# Patient Record
Sex: MALE | Race: WHITE | NOT HISPANIC OR LATINO | URBAN - METROPOLITAN AREA
[De-identification: names, ages, dates, MRNs, and addresses within clinical notes are randomized per-mention and may not be internally consistent; named-entity substitution may affect disease eponyms.]

---

## 2017-11-17 ENCOUNTER — OUTPATIENT (OUTPATIENT)
Dept: OUTPATIENT SERVICES | Facility: HOSPITAL | Age: 82
LOS: 1 days | Discharge: HOME | End: 2017-11-17
Payer: MEDICARE

## 2017-11-17 LAB
BUN SERPL-MCNC: 16 MG/DL — SIGNIFICANT CHANGE UP (ref 7–23)
CALCIUM SERPL-MCNC: 8.9 MG/DL — SIGNIFICANT CHANGE UP (ref 8.4–10.5)
CHLORIDE SERPL-SCNC: 104 MMOL/L — SIGNIFICANT CHANGE UP (ref 98–107)
CO2 SERPL-SCNC: 29 MMOL/L — SIGNIFICANT CHANGE UP (ref 22–31)
CREAT SERPL-MCNC: 0.84 MG/DL — SIGNIFICANT CHANGE UP (ref 0.5–1.3)
GLUCOSE SERPL-MCNC: 95 MG/DL — SIGNIFICANT CHANGE UP (ref 70–99)
HBA1C BLD-MCNC: 5.5 % — SIGNIFICANT CHANGE UP (ref 4–5.6)
HCT VFR BLD CALC: 48.6 % — SIGNIFICANT CHANGE UP (ref 39–50)
HGB BLD-MCNC: 16 G/DL — SIGNIFICANT CHANGE UP (ref 13–17)
MCHC RBC-ENTMCNC: 30.2 PG — SIGNIFICANT CHANGE UP (ref 27–34)
MCHC RBC-ENTMCNC: 32.9 % — SIGNIFICANT CHANGE UP (ref 32–36)
MCV RBC AUTO: 91.9 FL — SIGNIFICANT CHANGE UP (ref 80–100)
NRBC # FLD: 0 — SIGNIFICANT CHANGE UP
PLATELET # BLD AUTO: 193 K/UL — SIGNIFICANT CHANGE UP (ref 150–400)
PMV BLD: 9.9 FL — SIGNIFICANT CHANGE UP (ref 7–13)
POTASSIUM SERPL-MCNC: 4.2 MMOL/L — SIGNIFICANT CHANGE UP (ref 3.5–5.3)
POTASSIUM SERPL-SCNC: 4.2 MMOL/L — SIGNIFICANT CHANGE UP (ref 3.5–5.3)
RBC # BLD: 5.29 M/UL — SIGNIFICANT CHANGE UP (ref 4.2–5.8)
RBC # FLD: 13.8 % — SIGNIFICANT CHANGE UP (ref 10.3–14.5)
SODIUM SERPL-SCNC: 143 MMOL/L — SIGNIFICANT CHANGE UP (ref 135–145)
WBC # BLD: 7.86 K/UL — SIGNIFICANT CHANGE UP (ref 3.8–10.5)
WBC # FLD AUTO: 7.86 K/UL — SIGNIFICANT CHANGE UP (ref 3.8–10.5)

## 2017-11-17 PROCEDURE — 93010 ELECTROCARDIOGRAM REPORT: CPT

## 2017-11-17 RX ORDER — ASPIRIN/CALCIUM CARB/MAGNESIUM 324 MG
1 TABLET ORAL
Qty: 0 | Refills: 0 | COMMUNITY

## 2017-11-17 RX ORDER — SODIUM CHLORIDE 9 MG/ML
500 INJECTION INTRAMUSCULAR; INTRAVENOUS; SUBCUTANEOUS
Qty: 0 | Refills: 0 | Status: DISCONTINUED | OUTPATIENT
Start: 2017-11-17 | End: 2017-12-02

## 2017-11-17 RX ORDER — SODIUM CHLORIDE 9 MG/ML
3 INJECTION INTRAMUSCULAR; INTRAVENOUS; SUBCUTANEOUS EVERY 8 HOURS
Qty: 0 | Refills: 0 | Status: DISCONTINUED | OUTPATIENT
Start: 2017-11-17 | End: 2017-12-02

## 2017-11-17 RX ADMIN — SODIUM CHLORIDE 75 MILLILITER(S): 9 INJECTION INTRAMUSCULAR; INTRAVENOUS; SUBCUTANEOUS at 15:02

## 2017-11-17 NOTE — H&P CARDIOLOGY - NEGATIVE NEUROLOGICAL SYMPTOMS
no loss of consciousness/no hemiparesis/no facial palsy/no difficulty walking/no generalized seizures/no focal seizures/no headache/no confusion/no tremors/no vertigo/no loss of sensation/no paresthesias/no syncope/no transient paralysis/no weakness

## 2017-11-17 NOTE — H&P CARDIOLOGY - HISTORY OF PRESENT ILLNESS
82 y/o M w/ no sig. PMH presents for cardiac catheretization. Pt states that he has been experiencing dyspnea on exertion for the past few months which is worse when climbing stairs. Pt's echocardiogram from 10/10/17 showed EF 50-55%, global hypokinesis and mild to mod. AR. Pt denies N/V/D, fevers, chills, cough, palpitations, chest pain, substernal distress, syncope, orthopnea, nocturnal paroxysmal dyspnea, edema, cyanosis, hypertension, heart murmurs, varicosities, phlebitis, claudication. 84 y/o M w/ PMH of DVT s/p vein stripping presents for cardiac catheretization. Pt states that he has been experiencing dyspnea on exertion for the past few months which is worse when climbing stairs. Pt's echocardiogram from 10/10/17 showed EF 50-55%, global hypokinesis and mild to mod. AR. Pt denies N/V/D, fevers, chills, cough, palpitations, chest pain, substernal distress, syncope, orthopnea, nocturnal paroxysmal dyspnea, edema, cyanosis, hypertension, heart murmurs, varicosities, phlebitis, claudication.

## 2017-11-17 NOTE — H&P CARDIOLOGY - RS GEN PE MLT RESP DETAILS PC
respirations non-labored/airway patent/breath sounds equal/clear to auscultation bilaterally/no chest wall tenderness/good air movement

## 2020-11-27 ENCOUNTER — TRANSCRIPTION ENCOUNTER (OUTPATIENT)
Age: 85
End: 2020-11-27

## 2022-12-19 PROBLEM — I82.409 ACUTE EMBOLISM AND THROMBOSIS OF UNSPECIFIED DEEP VEINS OF UNSPECIFIED LOWER EXTREMITY: Chronic | Status: ACTIVE | Noted: 2017-11-17

## 2023-01-12 ENCOUNTER — NON-APPOINTMENT (OUTPATIENT)
Age: 88
End: 2023-01-12

## 2023-01-14 ENCOUNTER — RX ONLY (RX ONLY)
Age: 88
End: 2023-01-14

## 2023-01-14 ENCOUNTER — OFFICE (OUTPATIENT)
Dept: URBAN - METROPOLITAN AREA CLINIC 27 | Facility: CLINIC | Age: 88
Setting detail: OPHTHALMOLOGY
End: 2023-01-14
Payer: COMMERCIAL

## 2023-01-14 DIAGNOSIS — H02.011: ICD-10-CM

## 2023-01-14 DIAGNOSIS — H11.31: ICD-10-CM

## 2023-01-14 PROCEDURE — 67820 REVISE EYELASHES: CPT | Performed by: OPHTHALMOLOGY

## 2023-01-14 PROCEDURE — 92002 INTRM OPH EXAM NEW PATIENT: CPT | Performed by: OPHTHALMOLOGY

## 2023-01-14 ASSESSMENT — SPHEQUIV_DERIVED
OD_SPHEQUIV: -1.5
OS_SPHEQUIV: -0.75

## 2023-01-14 ASSESSMENT — KERATOMETRY
OD_AXISANGLE_DEGREES: 033
OS_AXISANGLE_DEGREES: 156
METHOD_AUTO_MANUAL: AUTO
OD_K2POWER_DIOPTERS: 45.50
OS_K2POWER_DIOPTERS: 45.25
OD_K1POWER_DIOPTERS: 44.75
OS_K1POWER_DIOPTERS: 44.75

## 2023-01-14 ASSESSMENT — AXIALLENGTH_DERIVED
OS_AL: 23.336
OD_AL: 23.5794

## 2023-01-14 ASSESSMENT — REFRACTION_AUTOREFRACTION
OS_SPHERE: -1.75
OS_AXIS: 164
OD_SPHERE: -2.00
OS_CYLINDER: +2.00
OD_CYLINDER: +1.00
OD_AXIS: 018

## 2023-01-14 ASSESSMENT — CONFRONTATIONAL VISUAL FIELD TEST (CVF)
OD_FINDINGS: FULL
OS_FINDINGS: FULL

## 2023-01-14 ASSESSMENT — LID EXAM ASSESSMENTS: OD_TRICHIASIS: RUL

## 2023-01-14 ASSESSMENT — TONOMETRY
OS_IOP_MMHG: 15
OD_IOP_MMHG: 17

## 2023-01-14 ASSESSMENT — VISUAL ACUITY
OS_BCVA: 20/60-2
OD_BCVA: 20/40-2+1

## 2023-01-27 ENCOUNTER — APPOINTMENT (OUTPATIENT)
Dept: VASCULAR SURGERY | Facility: CLINIC | Age: 88
End: 2023-01-27

## 2023-05-09 ENCOUNTER — NON-APPOINTMENT (OUTPATIENT)
Age: 88
End: 2023-05-09

## 2023-05-16 ENCOUNTER — OFFICE (OUTPATIENT)
Dept: URBAN - METROPOLITAN AREA CLINIC 77 | Facility: CLINIC | Age: 88
Setting detail: OPHTHALMOLOGY
End: 2023-05-16
Payer: MEDICARE

## 2023-05-16 DIAGNOSIS — H01.005: ICD-10-CM

## 2023-05-16 DIAGNOSIS — H01.004: ICD-10-CM

## 2023-05-16 DIAGNOSIS — H11.32: ICD-10-CM

## 2023-05-16 DIAGNOSIS — H11.31: ICD-10-CM

## 2023-05-16 DIAGNOSIS — H02.011: ICD-10-CM

## 2023-05-16 DIAGNOSIS — H25.13: ICD-10-CM

## 2023-05-16 DIAGNOSIS — H40.013: ICD-10-CM

## 2023-05-16 DIAGNOSIS — H01.002: ICD-10-CM

## 2023-05-16 DIAGNOSIS — H01.001: ICD-10-CM

## 2023-05-16 DIAGNOSIS — H35.013: ICD-10-CM

## 2023-05-16 DIAGNOSIS — H35.033: ICD-10-CM

## 2023-05-16 PROCEDURE — 92201 OPSCPY EXTND RTA DRAW UNI/BI: CPT | Performed by: OPHTHALMOLOGY

## 2023-05-16 PROCEDURE — 99213 OFFICE O/P EST LOW 20 MIN: CPT | Performed by: OPHTHALMOLOGY

## 2023-05-16 PROCEDURE — 92285 EXTERNAL OCULAR PHOTOGRAPHY: CPT | Performed by: OPHTHALMOLOGY

## 2023-05-16 ASSESSMENT — CONFRONTATIONAL VISUAL FIELD TEST (CVF)
OS_FINDINGS: FULL
OD_FINDINGS: FULL

## 2023-05-23 PROBLEM — H01.004 BLEPHARITIS; RIGHT UPPER LID, RIGHT LOWER LID, LEFT UPPER LID, LEFT LOWER LID: Status: ACTIVE | Noted: 2023-05-16

## 2023-05-23 PROBLEM — H35.3131 AGE RELATED MACULAR DEGENERATION DRY; BOTH EYES EARLY: Status: ACTIVE | Noted: 2023-05-16

## 2023-05-23 PROBLEM — H25.13 CATARACT SENILE NUCLEAR SCLEROSIS; BOTH EYES: Status: ACTIVE | Noted: 2023-05-16

## 2023-05-23 PROBLEM — H01.002 BLEPHARITIS; RIGHT UPPER LID, RIGHT LOWER LID, LEFT UPPER LID, LEFT LOWER LID: Status: ACTIVE | Noted: 2023-05-16

## 2023-05-23 PROBLEM — H01.001 BLEPHARITIS; RIGHT UPPER LID, RIGHT LOWER LID, LEFT UPPER LID, LEFT LOWER LID: Status: ACTIVE | Noted: 2023-05-16

## 2023-05-23 PROBLEM — H01.005 BLEPHARITIS; RIGHT UPPER LID, RIGHT LOWER LID, LEFT UPPER LID, LEFT LOWER LID: Status: ACTIVE | Noted: 2023-05-16

## 2023-05-23 PROBLEM — H11.32: Status: ACTIVE | Noted: 2023-05-16

## 2023-05-23 PROBLEM — H35.033 HYPERTENSIVE RETINOPATHY; BOTH EYES: Status: ACTIVE | Noted: 2023-05-16

## 2023-05-23 PROBLEM — H35.013 CHANGES IN RETINAL VASCULAR APPEARANCE; BOTH EYES: Status: ACTIVE | Noted: 2023-05-16

## 2023-05-23 PROBLEM — H40.013 GLAUCOMA SUSPECT, LOW RISK 1-2 FACTORS; BOTH EYES: Status: ACTIVE | Noted: 2023-05-16

## 2023-05-23 PROBLEM — H11.31: Status: ACTIVE | Noted: 2023-05-16

## 2023-05-23 ASSESSMENT — LID EXAM ASSESSMENTS
OS_BROW_PTOSIS: 2+
OD_BROW_PTOSIS: 2+
OD_TRICHIASIS: RUL
OS_BLEPHARITIS: LLL LUL 1+ 2+
OD_BLEPHARITIS: RLL RUL 2+ 3+

## 2023-05-25 ASSESSMENT — KERATOMETRY
OS_AXISANGLE_DEGREES: 173
METHOD_AUTO_MANUAL: AUTO
OD_K1POWER_DIOPTERS: 45.00
OD_AXISANGLE_DEGREES: 041
OD_K2POWER_DIOPTERS: 45.50
OS_K2POWER_DIOPTERS: 45.50
OS_K1POWER_DIOPTERS: 44.50

## 2023-05-25 ASSESSMENT — REFRACTION_AUTOREFRACTION
OD_CYLINDER: -0.75
OS_SPHERE: +0.75
OD_AXIS: 120
OS_CYLINDER: -3.00
OD_SPHERE: -0.75
OS_AXIS: 059

## 2023-05-25 ASSESSMENT — REFRACTION_MANIFEST
OD_SPHERE: -0.75
OS_AXIS: 55
OD_VA1: 20/40
OD_CYLINDER: -0.50
OS_CYLINDER: -2.00
OD_AXIS: 120
OS_SPHERE: +0.50
OS_VA1: 20/50

## 2023-05-25 ASSESSMENT — VISUAL ACUITY
OD_BCVA: 20/60
OS_BCVA: 20/60

## 2023-05-25 ASSESSMENT — SPHEQUIV_DERIVED
OS_SPHEQUIV: -0.75
OD_SPHEQUIV: -1.125
OD_SPHEQUIV: -1
OS_SPHEQUIV: -0.5

## 2023-05-25 ASSESSMENT — AXIALLENGTH_DERIVED
OD_AL: 23.3414
OS_AL: 23.241
OD_AL: 23.3892
OS_AL: 23.336
